# Patient Record
Sex: MALE | Race: WHITE | Employment: FULL TIME | URBAN - METROPOLITAN AREA
[De-identification: names, ages, dates, MRNs, and addresses within clinical notes are randomized per-mention and may not be internally consistent; named-entity substitution may affect disease eponyms.]

---

## 2017-06-25 ENCOUNTER — APPOINTMENT (OUTPATIENT)
Dept: GENERAL RADIOLOGY | Age: 27
End: 2017-06-25
Payer: COMMERCIAL

## 2017-06-25 ENCOUNTER — HOSPITAL ENCOUNTER (EMERGENCY)
Age: 27
Discharge: HOME | End: 2017-06-25
Attending: EMERGENCY MEDICINE
Payer: COMMERCIAL

## 2017-06-25 VITALS
SYSTOLIC BLOOD PRESSURE: 127 MMHG | WEIGHT: 190 LBS | HEART RATE: 80 BPM | OXYGEN SATURATION: 100 % | HEIGHT: 70 IN | TEMPERATURE: 98.4 F | RESPIRATION RATE: 20 BRPM | BODY MASS INDEX: 27.2 KG/M2 | DIASTOLIC BLOOD PRESSURE: 87 MMHG

## 2017-06-25 DIAGNOSIS — J40 BRONCHITIS: Primary | ICD-10-CM

## 2017-06-25 PROCEDURE — 99283 EMERGENCY DEPT VISIT LOW MDM: CPT

## 2017-06-25 PROCEDURE — 71020 XR CHEST STANDARD TWO VW: CPT

## 2017-06-25 PROCEDURE — 6370000000 HC RX 637 (ALT 250 FOR IP): Performed by: EMERGENCY MEDICINE

## 2017-06-25 RX ORDER — PREDNISONE 10 MG/1
50 TABLET ORAL DAILY
Qty: 5 TABLET | Refills: 0 | Status: SHIPPED | OUTPATIENT
Start: 2017-06-25 | End: 2017-06-30

## 2017-06-25 RX ORDER — DOXYCYCLINE HYCLATE 100 MG
100 TABLET ORAL ONCE
Status: COMPLETED | OUTPATIENT
Start: 2017-06-25 | End: 2017-06-25

## 2017-06-25 RX ORDER — DOXYCYCLINE HYCLATE 100 MG
100 TABLET ORAL 2 TIMES DAILY
Qty: 20 TABLET | Refills: 0 | Status: SHIPPED | OUTPATIENT
Start: 2017-06-25 | End: 2019-12-08

## 2017-06-25 RX ORDER — PREDNISONE 10 MG/1
50 TABLET ORAL DAILY
COMMUNITY
End: 2019-12-08

## 2017-06-25 RX ADMIN — DOXYCYCLINE HYCLATE 100 MG: 100 TABLET, COATED ORAL at 22:15

## 2017-06-26 ASSESSMENT — ENCOUNTER SYMPTOMS
WHEEZING: 1
COLOR CHANGE: 0
EYE REDNESS: 0
DIARRHEA: 0
NAUSEA: 0
EYE DISCHARGE: 0
CHEST TIGHTNESS: 0
BACK PAIN: 0
SORE THROAT: 0
RHINORRHEA: 0
COUGH: 1
EYE PAIN: 0
CONSTIPATION: 0
ABDOMINAL PAIN: 0
SHORTNESS OF BREATH: 0

## 2017-10-20 ENCOUNTER — APPOINTMENT (OUTPATIENT)
Dept: GENERAL RADIOLOGY | Facility: CLINIC | Age: 27
End: 2017-10-20
Payer: COMMERCIAL

## 2017-10-20 ENCOUNTER — HOSPITAL ENCOUNTER (EMERGENCY)
Facility: CLINIC | Age: 27
Discharge: HOME | End: 2017-10-21
Attending: EMERGENCY MEDICINE
Payer: COMMERCIAL

## 2017-10-20 DIAGNOSIS — S60.221A CONTUSION OF RIGHT HAND, INITIAL ENCOUNTER: Primary | ICD-10-CM

## 2017-10-20 PROCEDURE — 99283 EMERGENCY DEPT VISIT LOW MDM: CPT

## 2017-10-20 PROCEDURE — 73130 X-RAY EXAM OF HAND: CPT

## 2017-10-20 RX ORDER — ALBUTEROL SULFATE 90 UG/1
2 AEROSOL, METERED RESPIRATORY (INHALATION) EVERY 6 HOURS PRN
COMMUNITY
End: 2019-12-08

## 2017-10-20 ASSESSMENT — PAIN DESCRIPTION - ONSET: ONSET: SUDDEN

## 2017-10-20 ASSESSMENT — PAIN DESCRIPTION - LOCATION: LOCATION: HAND

## 2017-10-20 ASSESSMENT — PAIN DESCRIPTION - ORIENTATION: ORIENTATION: RIGHT

## 2017-10-20 ASSESSMENT — PAIN SCALES - GENERAL: PAINLEVEL_OUTOF10: 6

## 2017-10-20 ASSESSMENT — PAIN DESCRIPTION - PAIN TYPE: TYPE: ACUTE PAIN

## 2017-10-20 ASSESSMENT — PAIN DESCRIPTION - PROGRESSION: CLINICAL_PROGRESSION: GRADUALLY WORSENING

## 2017-10-20 ASSESSMENT — PAIN DESCRIPTION - DESCRIPTORS: DESCRIPTORS: CONSTANT;THROBBING

## 2017-10-21 VITALS
TEMPERATURE: 98.4 F | SYSTOLIC BLOOD PRESSURE: 127 MMHG | HEART RATE: 93 BPM | OXYGEN SATURATION: 99 % | RESPIRATION RATE: 17 BRPM | DIASTOLIC BLOOD PRESSURE: 83 MMHG

## 2017-10-21 ASSESSMENT — ENCOUNTER SYMPTOMS
RESPIRATORY NEGATIVE: 1
EYES NEGATIVE: 1
GASTROINTESTINAL NEGATIVE: 1

## 2017-10-21 NOTE — ED PROVIDER NOTES
eMERGENCY dEPARTMENT eNCOUnter      Pt Name: Araseli Grier  MRN: [id number]  [place] 1990  Date of evaluation: 10/20/2017      CHIEF COMPLAINT       Chief Complaint   Patient presents with    Hand Injury     right hand one pta          HISTORY OF PRESENT ILLNESS    Araseli Grier is a 32 y.o. male who presents To the emergency department evaluation of a right hand injury that he sustained when he punched a headboard. Patient does have some swelling to the dorsum of his hand but has good range of motion and pulses are equal.    REVIEW OF SYSTEMS         Review of Systems   HENT: Negative. Eyes: Negative. Respiratory: Negative. Gastrointestinal: Negative. Genitourinary: Negative. Musculoskeletal: Positive for joint pain. Skin: Negative. Neurological: Negative. Psychiatric/Behavioral: Negative. PAST MEDICAL HISTORY    has no past medical history on file. SURGICAL HISTORY      has no past surgical history on file. CURRENT MEDICATIONS       Previous Medications    ALBUTEROL SULFATE  (90 BASE) MCG/ACT INHALER    Inhale 2 puffs into the lungs every 6 hours as needed for Wheezing    CODEINE SULFATE PO    Take by mouth    DOXYCYCLINE HYCLATE (VIBRA-TABS) 100 MG TABLET    Take 1 tablet by mouth 2 times daily    PREDNISONE (DELTASONE) 10 MG TABLET    Take 50 mg by mouth daily       ALLERGIES     has No Known Allergies. FAMILY HISTORY     has no family status information on file. family history is not on file. SOCIAL HISTORY      reports that he has never smoked. He does not have any smokeless tobacco history on file. He reports that he does not drink alcohol or use drugs. PHYSICAL EXAM     INITIAL VITALS:  vitals were not taken for this visit. Physical Exam   Constitutional: He is oriented to person, place, and time. He appears well-developed and well-nourished. HENT:   Head: Normocephalic and atraumatic.    Eyes: EOM are normal. Pupils are equal, round, and reactive to light. Neck: Normal range of motion. Neck supple. Cardiovascular: Normal rate and regular rhythm. Pulmonary/Chest: Effort normal and breath sounds normal.   Abdominal: Soft. Bowel sounds are normal.   Musculoskeletal: Normal range of motion. He exhibits edema and tenderness. He exhibits no deformity. Neurological: He is alert and oriented to person, place, and time. Skin: Skin is warm and dry. Capillary refill takes 2 to 3 seconds. Psychiatric: He has a normal mood and affect. Vitals reviewed. DIFFERENTIAL DIAGNOSIS/ MDM:         DIAGNOSTIC RESULTS     EKG: All EKG's are interpreted by the Emergency Department Physician who either signs or Co-signs this chart in the absence of a cardiologist.        Not indicated unless otherwise documented above    LABS:  No results found for this visit on 10/20/17. Not indicated unless otherwise documented above    RADIOLOGY:   I reviewed the radiologist interpretations:  XR HAND RIGHT (MIN 3 VIEWS)   Final Result   Mild dorsal soft tissue swelling without evidence of acute fracture. Not indicated unless otherwise documented above    EMERGENCY DEPARTMENT COURSE:     The patient was given the following medications:  No orders of the defined types were placed in this encounter. Vitals: There were no vitals filed for this visit.  -------------------------  There were no vitals taken for this visit. I have reviewed the disposition diagnosis with the patient and or their family/guardian. I have answered their questions and given discharge instructions. They voiced understanding of these instructions and did not have any further questions or complaints. CRITICAL CARE:    None    CONSULTS:    None    PROCEDURES:    None      OARRS Report if indicated             FINAL IMPRESSION      1.  Contusion of right hand, initial encounter          DISPOSITION/PLAN   DISPOSITION Decision To Discharge    I have reviewed the disposition diagnosis with the patient and or their family/guardian. I have answered their questions and given discharge instructions. They voiced understanding of these instructions and did not have any further questions or complaints.     PATIENT REFERRED TO:  Jamin Virgilina Cassius [address] [place] [address], [address] [facility] [address] [phone]    In 2 days        DISCHARGE MEDICATIONS:  New Prescriptions    No medications on file       (Please note that portions of this note were completed with a voice recognition program.  Efforts were made to edit the dictations but occasionally words are mis-transcribed.)    Rodrigez MD  Attending Emergency Physician             Leticia Fenton MD  10/21/17 1785

## 2019-12-08 ENCOUNTER — HOSPITAL ENCOUNTER (EMERGENCY)
Age: 29
Discharge: HOME | End: 2019-12-08
Attending: EMERGENCY MEDICINE
Payer: COMMERCIAL

## 2019-12-08 ENCOUNTER — APPOINTMENT (OUTPATIENT)
Dept: GENERAL RADIOLOGY | Age: 29
End: 2019-12-08
Payer: COMMERCIAL

## 2019-12-08 VITALS
RESPIRATION RATE: 18 BRPM | WEIGHT: 190 LBS | TEMPERATURE: 98.4 F | SYSTOLIC BLOOD PRESSURE: 149 MMHG | DIASTOLIC BLOOD PRESSURE: 89 MMHG | HEART RATE: 93 BPM | HEIGHT: 70 IN | BODY MASS INDEX: 27.2 KG/M2 | OXYGEN SATURATION: 96 %

## 2019-12-08 DIAGNOSIS — S62.308A: Primary | ICD-10-CM

## 2019-12-08 PROCEDURE — 73130 X-RAY EXAM OF HAND: CPT

## 2019-12-08 PROCEDURE — 99283 EMERGENCY DEPT VISIT LOW MDM: CPT

## 2019-12-08 PROCEDURE — 29125 APPL SHORT ARM SPLINT STATIC: CPT

## 2019-12-08 RX ORDER — OXYCODONE HYDROCHLORIDE AND ACETAMINOPHEN 5; 325 MG/1; MG/1
1 TABLET ORAL EVERY 6 HOURS PRN
Qty: 10 TABLET | Refills: 0 | Status: SHIPPED | OUTPATIENT
Start: 2019-12-08 | End: 2019-12-11

## 2019-12-08 ASSESSMENT — PAIN DESCRIPTION - FREQUENCY: FREQUENCY: CONTINUOUS

## 2019-12-08 ASSESSMENT — PAIN DESCRIPTION - PROGRESSION: CLINICAL_PROGRESSION: NOT CHANGED

## 2019-12-08 ASSESSMENT — PAIN DESCRIPTION - LOCATION: LOCATION: FINGER (COMMENT WHICH ONE)

## 2019-12-08 ASSESSMENT — PAIN SCALES - GENERAL: PAINLEVEL_OUTOF10: 5

## 2019-12-08 ASSESSMENT — PAIN DESCRIPTION - DESCRIPTORS: DESCRIPTORS: ACHING

## 2019-12-08 ASSESSMENT — PAIN DESCRIPTION - ONSET: ONSET: ON-GOING

## 2019-12-08 ASSESSMENT — PAIN DESCRIPTION - PAIN TYPE: TYPE: ACUTE PAIN

## 2019-12-09 ASSESSMENT — ENCOUNTER SYMPTOMS
RESPIRATORY NEGATIVE: 1
GASTROINTESTINAL NEGATIVE: 1

## 2019-12-14 ENCOUNTER — HOSPITAL ENCOUNTER (EMERGENCY)
Age: 29
Discharge: HOME | End: 2019-12-14
Attending: EMERGENCY MEDICINE
Payer: COMMERCIAL

## 2019-12-14 VITALS
RESPIRATION RATE: 16 BRPM | TEMPERATURE: 98.1 F | DIASTOLIC BLOOD PRESSURE: 83 MMHG | SYSTOLIC BLOOD PRESSURE: 130 MMHG | HEART RATE: 78 BPM | OXYGEN SATURATION: 100 %

## 2019-12-14 DIAGNOSIS — G89.18 ACUTE POST-OPERATIVE PAIN: Primary | ICD-10-CM

## 2019-12-14 PROCEDURE — 99283 EMERGENCY DEPT VISIT LOW MDM: CPT

## 2019-12-14 ASSESSMENT — PAIN DESCRIPTION - PAIN TYPE: TYPE: ACUTE PAIN

## 2019-12-14 ASSESSMENT — PAIN DESCRIPTION - FREQUENCY: FREQUENCY: CONTINUOUS

## 2019-12-14 ASSESSMENT — PAIN DESCRIPTION - DESCRIPTORS: DESCRIPTORS: THROBBING

## 2019-12-14 ASSESSMENT — PAIN DESCRIPTION - LOCATION: LOCATION: HAND

## 2019-12-14 ASSESSMENT — PAIN SCALES - GENERAL: PAINLEVEL_OUTOF10: 4

## 2019-12-14 ASSESSMENT — PAIN DESCRIPTION - ORIENTATION: ORIENTATION: RIGHT

## 2020-01-30 ENCOUNTER — HOSPITAL ENCOUNTER (OUTPATIENT)
Dept: OCCUPATIONAL THERAPY | Facility: CLINIC | Age: 30
Setting detail: THERAPIES SERIES
Discharge: HOME | End: 2020-01-30
Payer: COMMERCIAL

## 2020-01-30 PROCEDURE — 97140 MANUAL THERAPY 1/> REGIONS: CPT

## 2020-01-30 PROCEDURE — 97165 OT EVAL LOW COMPLEX 30 MIN: CPT

## 2020-01-30 PROCEDURE — 97110 THERAPEUTIC EXERCISES: CPT
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
## 2020-02-03 ENCOUNTER — HOSPITAL ENCOUNTER (OUTPATIENT)
Dept: OCCUPATIONAL THERAPY | Facility: CLINIC | Age: 30
Setting detail: THERAPIES SERIES
Discharge: HOME | End: 2020-02-03
Payer: COMMERCIAL

## 2020-02-03 PROCEDURE — 97110 THERAPEUTIC EXERCISES: CPT

## 2020-02-03 PROCEDURE — 97140 MANUAL THERAPY 1/> REGIONS: CPT

## 2020-02-03 NOTE — FLOWSHEET NOTE
[] [place]       Occupational Therapy             1st floor       [address] [place], [place]         Phone: [phone]       Fax: [phone] [x] 6126 [facility] at            [address] , [address] [place] [address]     Phone: [phone]     Fax: [phone]      Occupational Therapy Daily Treatment Note    Date:  2/3/2020  Patient Name:  Fabby Padron    :  1990  MRN: [id number]  Insurance: [facility] - [employer]: [employer]     Medical Diagnosis: Closed displaced fracture of shaft of fourth metacarpal bone of right hand (spiral fx) S62.324A. Rehab Codes: Stiffness in hand M25.64, adherent scar L90.5, edema localized R60.0, pain in right hand M79.641,  pain in right finger(s) M79.644.      Onset Date: 19               Next Dr. Gerry Rothman: 20  1:00 [address]  office  Visit# / Total Visits:   C-9 [facility] for 36 visits 20 to 20. Cancels/No Shows: 0/0      Subjective:    Pain:  [x] Yes  [] No Location: Between the Middle and ring finger MCP joints Pain Rating: (0-10 scale) 2/10  Pain altered Tx:  [x] No  [] Yes  Action: NA  Pt Comments: \"My hand feels like I worked out\". Objective:  Modalities:     Exercises:  Exercise Reps/Time Weight/Level Comments   Edema massage     Administered  Home exercise program reviewed. Pt independent. Goal MET   Scar massage     Administered  Home exercise program reviewed. Pt independent. Goal MET   AROM- right hand - composite 10 reps   Completed  Home exercise program reviewed. Pt independent. Goal MET   AROM - right hand - blocked 10 reps   Completed  . Home exercise program reviewed. Pt independent. Goal MET   Resistive  - foam block 10 reps Pink Completed   Home exercise program reviewed. Pt independent.   Goal MET   Towel crunches  10 reps    Added  Pt education provided: written, verbal, demo.       Comments/Assessment:  Pt is s/p spiral fracture of the shaft of the right dominant fourth

## 2020-02-05 ENCOUNTER — HOSPITAL ENCOUNTER (OUTPATIENT)
Dept: OCCUPATIONAL THERAPY | Facility: CLINIC | Age: 30
Setting detail: THERAPIES SERIES
Discharge: HOME | End: 2020-02-05
Payer: COMMERCIAL

## 2020-02-05 PROCEDURE — 97110 THERAPEUTIC EXERCISES: CPT

## 2020-02-05 PROCEDURE — 97140 MANUAL THERAPY 1/> REGIONS: CPT

## 2020-02-05 NOTE — FLOWSHEET NOTE
[] [place]       Occupational Therapy             1st floor       [address] [place], [place]         Phone: [phone]       Fax: [phone] [x] 6135 [facility] at            [address] , [address] [place] [address]     Phone: [phone]     Fax: [phone]      Occupational Therapy Daily Treatment Note    Date:  2020  Patient Name:  Dakotah Proctor    :  1990  MRN: [id number]  Insurance: [facility] - [employer]: [employer]     Medical Diagnosis: Closed displaced fracture of shaft of fourth metacarpal bone of right hand (spiral fx) S62.324A. Rehab Codes: Stiffness in hand M25.64, adherent scar L90.5, edema localized R60.0, pain in right hand M79.641,  pain in right finger(s) M79.644.      Onset Date: 19               Next Dr. Onofre Moody: 20  1:00 [facility] office  Visit# / Total Visits: 3/36  C-9 auth for 36 visits 20 to 20. Cancels/No Shows: 0/0      Subjective:    Pain:  [x] Yes  [] No Location: Between the Middle and ring finger MCP joints Pain Rating: (0-10 scale) 1-2/10  Pain altered Tx:  [x] No  [] Yes  Action: NA  Pt Comments: \"My hand feels good today\". Objective:  Modalities:     Exercises:  Exercise Reps/Time Weight/Level Comments   Edema massage     Administered     Scar massage     Administered     AROM- right hand - composite 15 reps   Increased reps   AROM - right hand - blocked 15 reps   Increased reps  Discontinued middle finger, continued with ring and little fingers   Resistive  - foam block 15 reps [place] Increased reps   Towel crunches  10 reps    Completed      Place and hold (fisting) 4 reps  Added            Comments/Assessment:  Pt is s/p spiral fracture of the shaft of the right dominant fourth metacarpal.   Edema: decreasing on the ulnar aspect of the hand as evidenced by increased skin wrinkling, increased metacarpal phalangeal (MCP) joint definition.  edema of right ring finger also decreasing at the P1 area.  Scar:  Pin site scars softening, with increased mobility as evidenced by palpation. AROM: Pt demo's ability to make a modified fist with the right index and middle fingers, now touching palm with the ring finger,  little finger not touching at this time. PROM: ring and little finger have full composite flexion with mild over-pressure. Brief, sharp pain with PROM, reduced with extensor perez soft tissue massage. Towel crunches continued. Added place and hold exercise, with fair tolerance (painful). Specific Instructions for Next Treatment:      Treatment Charges: Mins Units Time In/Out   []?  Modalities         [x]?   Ther Exercise 33 2 1054 - 1135   [x]?   Manual Therapy 13 1 1045 - 1057   []?  Ther Activities         []?           []?           []?           Total Treatment time 46 min             Assessment: [x] Progressing toward goals. [] No change. [] Other:    Short Term Goals: (  10  Treatments)  1. Decrease Pain:  Will have 0/10 pain with non-resistive to mildly resistive activity. 2. Increase AROM/PROM (degrees): Will have full composite PROM flexion/extesnion of all fingers/all joints. Will be able to make a loose fist, touching ring and little fingers to palm. 3. Increase strength (pounds): Will have a right  strength of 30 or more pounds. 4. Increase function:UE Functional Index Score 46/80 or more to promote increased function abilities. 5. Scars (pin sites) will be soft and pliable with minimal tethering. 6. Decrease Edema: circumfirential measurements of bilateral ring fingers will have a variance of 0.2 cm or less. 7. Independent with [facility] in 3 sessions - MET.     Long Term Goals: (  20  Treatments)  1. Decrease Pain:  Will have 0/10 pain with mild to moderately resistive activity. 2.   Increase AROM/PROM (degrees): Will have full compositefist/full composite extension of all right fingers. 3.   ncrease strength (pounds):  Will have a right  strength of 45 or more

## 2020-02-10 ENCOUNTER — HOSPITAL ENCOUNTER (OUTPATIENT)
Dept: OCCUPATIONAL THERAPY | Facility: CLINIC | Age: 30
Setting detail: THERAPIES SERIES
Discharge: HOME | End: 2020-02-10
Payer: COMMERCIAL

## 2020-02-10 PROCEDURE — 97110 THERAPEUTIC EXERCISES: CPT

## 2020-02-10 PROCEDURE — 97140 MANUAL THERAPY 1/> REGIONS: CPT

## 2020-02-10 NOTE — FLOWSHEET NOTE
AROM/PROM (degrees): Will have full compositefist/full composite extension of all right fingers. 3.   ncrease strength (pounds): Will have a right  strength of 45 or more pounds. 4.   Increase function:UE Functional Index Score 60/80 or more to promote increased function abilities. 5.   Decrease Edema: circumfirential measurements of bilateral ring fingers will have a variance of 0.0 cm (edema resolved).     Patient Goals: Regain full function, full range of motion of my hand. Pt. Education:  [] Yes  [] No  [x] Reviewed Prior HEP/Ed  Method of Education: [x] Verbal  [] Demo  [] Written  Re:  Comprehension of Education:  [] Verbalizes understanding. [] Demonstrates understanding. [] Needs review. [x] Demonstrates/verbalizes HEP/Ed previously given. Treatment Plan:  Frequency/Duration:  3  Times a week, for 36  Visits. C-9 auth'd 01/17/20 to 04/18/20  Therapeutic Exercise 42461, Manual Therapy 55091, Ultrasound 89441, Written Home Program and Hot Pack/Cold Pack 36344       Time In/Out: 1045 -  1125  Total Treatment Time:  36   Min.       Electronically signed by:  PAYTON Mari/ZAID , AKIRA

## 2020-02-12 ENCOUNTER — HOSPITAL ENCOUNTER (OUTPATIENT)
Dept: OCCUPATIONAL THERAPY | Facility: CLINIC | Age: 30
Setting detail: THERAPIES SERIES
Discharge: HOME | End: 2020-02-12
Payer: COMMERCIAL

## 2020-02-12 PROCEDURE — 97140 MANUAL THERAPY 1/> REGIONS: CPT

## 2020-02-12 PROCEDURE — 97110 THERAPEUTIC EXERCISES: CPT

## 2020-02-12 NOTE — FLOWSHEET NOTE
[] [place]       Occupational Therapy             1st floor       [address] [place], [place]         Phone: [phone]       Fax: [phone] [x] 6135 [facility] at            [address] , [address] [place] [address]     Phone: [phone]     Fax: [phone]      Occupational Therapy Daily Treatment Note    Date:  2020  Patient Name:  Matias Dutton    :  1990  MRN: [id number]  Insurance: [facility] - [employer]: [employer]     Medical Diagnosis: Closed displaced fracture of shaft of fourth metacarpal bone of right hand (spiral fx) S62.324A. Rehab Codes: Stiffness in hand M25.64, adherent scar L90.5, edema localized R60.0, pain in right hand M79.641,  pain in right finger(s) M79.644.      Onset Date: 19               Next Dr. Gottlieb Pelayo: 20  1:00 [address] [facility] office  Visit# / Total Visits:   C-9 [facility] for 36 visits 20 to 20. Cancels/No Shows: 0/0      Subjective:    Pain:  [] Yes  [x] No Location: Between the Middle and ring finger MCP joints Pain Rating: (0-10 scale) 2/10  Pain altered Tx:  [x] No  [] Yes  Action: NA  Pt Comments: \" Yesterday had a episode of numbness and tingling in my thumb, it's gone today\".     Objective:  Modalities:     Exercises:  Exercise Reps/Time Weight/Level Comments   Edema massage     Administered     Scar massage     Administered     AROM- right hand - composite 10 reps   Decreased reps due to median nerve irritation   AROM - right hand - blocked 10 reps   Decreased reps  ring and little fingers, at table edge   Resistive  - foam block 10 reps Pink Decreased resistance, decreased reps    Towel crunches  10 reps    Completed      Place and hold (fisting) 6 reps  Completed   Marbles - ulnar release 4 handfuls  Decreased reps   Resistive  with hand exercisor 21 reps 20 pounds Decreased resistance and reps            Comments/Assessment:  Pt is s/p spiral fracture of the shaft of the right dominant measurements of bilateral ring fingers will have a variance of 0.2 cm or less. 7. Independent with [facility] in 3 sessions - MET.     Long Term Goals: (  20  Treatments)  1. Decrease Pain:  Will have 0/10 pain with mild to moderately resistive activity. 2.   Increase AROM/PROM (degrees): Will have full compositefist/full composite extension of all right fingers. 3.   ncrease strength (pounds): Will have a right  strength of 45 or more pounds. 4.   Increase function:UE Functional Index Score 60/80 or more to promote increased function abilities. 5.   Decrease Edema: circumfirential measurements of bilateral ring fingers will have a variance of 0.0 cm (edema resolved).     Patient Goals: Regain full function, full range of motion of my hand. Pt. Education:  [] Yes  [x] No  [] Reviewed Prior HEP/Ed  Method of Education: [] Verbal  [] Demo  [] Written  Re:  Comprehension of Education:  [] Verbalizes understanding. [] Demonstrates understanding. [] Needs review. [x] Demonstrates/verbalizes HEP/Ed previously given. Treatment Plan:  Frequency/Duration:  3  Times a week, for 36  Visits. C-9 auth'd 01/17/20 to 04/18/20  Therapeutic Exercise 61839, Manual Therapy 78673, Ultrasound 26303, Written Home Program and Hot Pack/Cold Pack 42418       Time In/Out: 1053 - 1133  Total Treatment Time:  36   Min.       Electronically signed by:  PAYTON Paniagua/ZAID , CHMYNOR

## 2020-02-17 ENCOUNTER — HOSPITAL ENCOUNTER (OUTPATIENT)
Dept: OCCUPATIONAL THERAPY | Facility: CLINIC | Age: 30
Setting detail: THERAPIES SERIES
Discharge: HOME | End: 2020-02-17
Payer: COMMERCIAL

## 2020-02-17 PROCEDURE — 97140 MANUAL THERAPY 1/> REGIONS: CPT

## 2020-02-17 PROCEDURE — 97035 APP MDLTY 1+ULTRASOUND EA 15: CPT

## 2020-02-17 PROCEDURE — 97110 THERAPEUTIC EXERCISES: CPT

## 2020-02-17 NOTE — FLOWSHEET NOTE
[] [place]       Occupational Therapy             1st floor       [address], [place]         Phone: [phone]       Fax: [phone] [x] 6135 [facility] at            [address] , [address] [place] [address]     Phone: [phone]     Fax: [phone]      Occupational Therapy Daily Treatment Note    Date:  2020  Patient Name:  Julia Rayo    :  1990  MRN: [id number]  Insurance: [facility] - [email or web address]: [employer]     Medical Diagnosis: Closed displaced fracture of shaft of fourth metacarpal bone of right hand (spiral fx) S62.324A. Rehab Codes: Stiffness in hand M25.64, adherent scar L90.5, edema localized R60.0, pain in right hand M79.641,  pain in right finger(s) M79.644.      Onset Date: 19               Next Dr. Onofre Moody: 20  1:00 [address] office  Visit# / Total Visits:   C-9 [facility] for 36 visits 20 to 20. Cancels/No Shows: 0/0      Subjective:    Pain:  [] Yes  [x] No Location: Between the Middle and ring finger MCP joints Pain Rating: (0-10 scale) 2/10  Pain altered Tx:  [x] No  [] Yes  Action: NA  Pt Comments: \" Yesterday had a episode of numbness and tingling in my thumb, it's gone today\". Objective:  Modalities:   Modality Flow Sheet:  START STOP Tx Modality     Electrical Stim:     20  Ultrasound: 0.8 W/cm2 x 8 mins total  Duty factor: __100%  __50%  __33% __20%  Head size: 2 cm  MHz: __1mHz  __3mHz  Location: Dorsum right ring MCP joint, little finger DIP joint - 4 minutes each.      Hot Pack:     Cold Pack:     Exercises:  Exercise Reps/Time Weight/Level Comments   Edema massage     Administered     Scar massage     Administered     AROM- right hand - composite 10 reps   Completed   AROM - right hand - blocked 10 reps   Decreased reps  ring and little fingers, at table edge   Resistive  - foam block 10 reps Pink Decreased resistance, decreased reps    Towel crunches  10 reps    Completed    Place and hold (fisting) 6 reps  Completed   Marbles - ulnar release 1 series  Increased reps   Resistive  with hand exercisor 27 reps 25 pounds Increased resistance and reps   Resistive /pinches/extension 10 reps each [place] Added  Pt education/precautions provided: written, verbal, demo.      Comments/Assessment:  Pt is s/p spiral fracture of the shaft of the right dominant fourth metacarpal.   Edema: decreasing on the ulnar aspect of the hand as evidenced by increased skin wrinkling, increased metacarpal phalangeal (MCP) joint definition. Edema of right ring finger also decreasing at the P1 area. Scar:  Pin site scars softening, with increased mobility as evidenced by palpation. AROM: Pt demo's ability to make a full fist with the right index, middle, and ring fingers, little finger on arrival touching palm in modified fist with mild effort (improved). PROM: ittle finger has full composite flexion with mild over-pressure. Towel crunches, place and hold exercises, ulnar release of marbles for little finger dexterity, foam block increased as noted above. Putty added to exercise program, with good pt performance observed. Pain 0/10 pre- and post treatment. Specific Instructions for Next Treatment:      Treatment Charges: Mins Units Time In/Out   []?  Modalities: Ultrasound 8 7 0037 - 0919   [x]?   Ther Exercise 30 2 1102 - 1132   [x]?   Manual Therapy 22 1 1040 - 1102   []?  Ther Activities         []?           []?           []?           Total Treatment time 60 min             Assessment: [] Progressing toward goals. [x] No change. [] Other:    Short Term Goals: (  10  Treatments)  1. Decrease Pain:  Will have 0/10 pain with non-resistive to mildly resistive activity. 2. Increase AROM/PROM (degrees): Will have full composite PROM flexion/extesnion of all fingers/all joints. Will be able to make a loose fist, touching ring and little fingers to palm. 3. Increase strength (pounds):  Will have

## 2020-02-19 ENCOUNTER — HOSPITAL ENCOUNTER (OUTPATIENT)
Dept: OCCUPATIONAL THERAPY | Facility: CLINIC | Age: 30
Setting detail: THERAPIES SERIES
Discharge: HOME | End: 2020-02-19
Payer: COMMERCIAL

## 2020-02-19 PROCEDURE — 97140 MANUAL THERAPY 1/> REGIONS: CPT

## 2020-02-19 PROCEDURE — 97110 THERAPEUTIC EXERCISES: CPT

## 2020-02-19 NOTE — PROGRESS NOTES
improvement in functional abilities  Initial Functional Level:  36/80 functionally impaired as measured with the Upper Extremity Functional Index Survey. (The UEFI model does not provide any specific cut off points that could classify the upper limb disability degree, however, a minimal detectable change of 9 points is provided. This means that for improvement or deterioration to be considered, between two subsequent evaluations, the scores must differ by at least 9 points.)      AROM:  DIGITS   Current 02/19/20       Extension/Flexion  RIGHT   Degrees INDEX MIDDLE RING LITTLE   MCP    +8/WNL +20/WNL +18/80    WNL +15/WNL   PIP -10/WNL  -14/WNL -18/100 -10/WNL   DIP  +5/WNL   +5/WNL +10/75    WNL  +4/WNL    Full fist Full fist Full fist (improved 2 cm) Full fist (improved 4 cm)   PIP extensor lags of all four fingers documented above. AROM:  DIGITS   Comparison, Initial 01/30/20       Extension/Flexion  RIGHT   Degrees INDEX MIDDLE RING LITTLE   MCP    -2/98    -7/92   -3/73  +8/74   PIP -15/104  -15/88 -20/82 -23/70   DIP  +4/55 +15/40  +5/44 -  5/41    Touching finger pad to palm Touching finger pad to palm Finger pad is 2 cm from touching pad to palm Finger pad is 4 cm from touching pad to palm   Right wrist and thumb are unremarkable for AROM. STRENGTH   Initial 02/19/20             Pounds RIGHT LEFT    46.3 95.3   Lateral pinch 19 24   2 point pinch 13 16   3 jaw pinch 15 17     The affected extremity is 52.9% weaker than the unaffected extremity. (affected score/unaffected score, take the total and subtract from 100)    Sensibility: Normal     Edema: Circumfirential measurements of bilateral ring fingers at P1:  Right  6.9 cm (decr 0.3 cm), Left 6.7 cm (same)    Variance: 0.2 cm - improved 0.3 cm. A variance of 0.5 cm or more is considered significant edema.       Skin Color: Normal    Skin: Healed    Problems: Pain, ROM, Strength, Function, Edema and Adherent Scar      Short Term Goals: (  10 attempting to restrain a suspect. Fracture is s/p pinning (2). Edema: resolved in hand. Pt has slight edema of the P1 area of the ring finger (0.2cm variance compared to contralateral ring finger). AROM: Pt demo's ability to make a full active right fist. PIP joint extensor lags of all fingers documented above. Scar:  Pin site scars soft with minimal tethering. Strength: The affected right dominant  is currently 52.9% weaker than the unaffected left . Pt needs full strength to return to work as a . 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      Modalities:   Modality Flow Sheet:  START STOP Tx Modality       Electrical Stim:      02/17/20   Ultrasound: 0.8 W/cm2 x 8 mins total  Duty factor: __100%  __50%  __33% __20%  Head size: 2 cm  MHz: __1mHz  __3mHz  Location: Volar PIP joint, dorsal little finger DIP joint - 4 minutes each.       Hot Pack:       Cold Pack:      Exercises:  Exercise Reps/Time Weight/Level Comments   Edema massage     Administered      Scar massage     Administered      AROM- right hand - composite 20 reps   Completed   AROM - right hand - blocked 20 reps   Completed   Resistive  - foam block 10 reps Pink Not Completed due to measurements   Towel crunches  10 reps    Not Completed      Place and hold (fisting)   Discontinued  Full active fist present   Marbles - ulnar release 1 series   Not Completed   Resistive  with hand exercisor 45 reps 25 pounds Increased reps   Resistive /pinches/extension 5-10 reps each Tan Completed        Treatment This Date:   Treatment Charges: Mins Units Time In/Out   [x]  Modalities: Ultrasound   8 0 1102 - 1110   [x]  Ther Exercise 43 3 1118 - 1201   [x]  Manual Therapy   8 1 1110 - 1118   []  Ther Activities      []        []        []        Total Treatment time 59 min Pt. Education:  []? Yes  [x]? No  []? Reviewed Prior HEP/Ed  Method of Education: []? Verbal  []? Demo  []? Written  Re:  Comprehension of Education:  []? Verbalizes understanding. []? Demonstrates understanding. []? Needs review. []? Demonstrates/verbalizes HEP/Ed previously given.       Treatment Plan:  Frequency/Duration:  3  Times a week, for 36  Visits.   C-9 auth'd 01/17/20 to 04/18/20  Therapeutic Exercise 70496, Manual Therapy 08398, Ultrasound 00198, Written Home Program and Hot Pack/Cold Pack 53951     Total Treatment Time:  59  Minutes     Time In/Time Out: 1102 - 1201       Electronically signed by PAYTON Paniagua/L, CHT on 2/19/2020 at 11:03 AM

## 2020-02-20 ENCOUNTER — HOSPITAL ENCOUNTER (OUTPATIENT)
Dept: OCCUPATIONAL THERAPY | Facility: CLINIC | Age: 30
Setting detail: THERAPIES SERIES
Discharge: HOME | End: 2020-02-20
Payer: COMMERCIAL

## 2020-02-20 PROCEDURE — 97110 THERAPEUTIC EXERCISES: CPT

## 2020-02-20 PROCEDURE — 97140 MANUAL THERAPY 1/> REGIONS: CPT

## 2020-02-20 NOTE — FLOWSHEET NOTE
to promote increased function abilities - MET (61/80). 5. Scars (pin sites) will be soft and pliable with minimal tethering - MET. 6. Decrease Edema: circumfirential measurements of bilateral ring fingers will have a variance of 0.2 cm or less - MET (0.2 variance). 7. Independent with HEP in 3 sessions - MET.     Long Term Goals: (  20  Treatments)  1. Decrease Pain:  Will have 0/10 pain with mild to moderately resistive activity. Ongoing  2. Increase AROM/PROM (degrees): Will have full compositefist/full composite extension of all right fingers (WNL) - MET (full fist/full extension). 3.   Increase strength (pounds): Will have a right  strength of 45 or more pounds - MET (46.3) New  goal (revised 02/19/20): Will have a right  strength of 60 or more pounds. 4.   Increase function: UE Functional Index Score 60/80 or more to promote increased function abilities - MET (61/80) New function goal (revised 02/19/20): UE Functional Index Score 70/80 or more to promote increased function abilities . 5.   Decrease Edema: circumfirential measurements of bilateral ring fingers will have a variance of 0.0 cm (edema resolved). Ongoing     Patient Goals: Regain full function - Improved, Unmet, full range of motion of my hand - MET. New goal (added 02/19/20): Be able to fire firearms for work.       Pt. Education:  [] Yes  [x] No  [] Reviewed Prior HEP/Ed  Method of Education: [] Verbal  [] Demo  [] Written  Re:  Comprehension of Education:  [] Verbalizes understanding. [] Demonstrates understanding. [] Needs review. [] Demonstrates/verbalizes HEP/Ed previously given. Treatment Plan:  Frequency/Duration:  3  Times a week, for 36  Visits. C-9 auth'd 01/17/20 to 04/18/20  Therapeutic Exercise 37148, Manual Therapy 45630, Ultrasound 79098, Written Home Program and Hot Pack/Cold Pack 27786       Time In/Out: 3963 - 3773  Total Treatment Time:  36   Min.       Electronically signed by:  Néstor Birmingham

## 2020-02-24 ENCOUNTER — HOSPITAL ENCOUNTER (OUTPATIENT)
Dept: OCCUPATIONAL THERAPY | Facility: CLINIC | Age: 30
Setting detail: THERAPIES SERIES
Discharge: HOME | End: 2020-02-24
Payer: COMMERCIAL

## 2020-02-24 PROCEDURE — 97110 THERAPEUTIC EXERCISES: CPT

## 2020-02-24 NOTE — FLOWSHEET NOTE
[] [place]       Occupational Therapy             1st floor       [address] [facility]         Phone: [phone]       Fax: [phone] [x] [address] [facility] at            [address] , [address] [place] [address]     Phone: [phone]     Fax: [phone]      Occupational Therapy Daily Treatment Note    Date:  2020  Patient Name:  Didier Collier    :  1990  MRN: [id number]  Insurance: [facility] - [employer]: [employer]     Medical Diagnosis: Closed displaced fracture of shaft of fourth metacarpal bone of right hand (spiral fx) S62.324A. Rehab Codes: Stiffness in hand M25.64, adherent scar L90.5, edema localized R60.0, pain in right hand M79.641,  pain in right finger(s) M79.644.      Onset Date: 19               Next Dr. Newberry Peper: 20  1:00 [address] [facility] office  Visit# / Total Visits:   C-9 [facility] for 36 visits 20 to 20.    Cancels/No Shows: 0/0      Subjective:    Pain:  [] Yes  [x] No Location: Between the Middle and ring finger MCP joints Pain Rating: (0-10 scale) 0/10  Pain altered Tx:  [x] No  [] Yes  Action: NA  Pt Comments: NA    Objective:  Modalities:  Modality Flow Sheet:  START STOP Tx Modality       Electrical Stim:      20   Ultrasound: 0.8 W/cm2 x 8 mins total  Duty factor: __100%  __50%  __33% __20%  Head size: 2 cm  MHz: __1mHz  __3mHz  Location: Dorsal MCP joints of middle and ring fingers       Hot Pack:       Cold Pack:      Exercises:  Exercise Reps/Time Weight/Level Comments   Edema massage     Administered      Scar massage     Discontinued      AROM- right hand - composite 20 reps   Completed   AROM - right hand - blocked 20 reps   Completed   Resistive  - foam block 10 reps Blue Increased resistance     Towel crunches  10 reps    Completed      Place and hold (fisting)     Discontinued   Marbles - ulnar release    Discontinued   Resistive  with hand exercisor 30 reps 30 pounds Increased resistance, decreased reps   Resistive /pinches/extension 10 reps each Tan Increased reps      Resistive pinch with foam cubes and pinch pin - ring and little fingers 1 handful 2 pounds Completed   Digi-flex As tolerated 5 pounds Added         Comments/Assessment:   Pt is s/p spiral fracture of the shaft of the right dominant fourth metacarpal.   Edema: Zero to slight on the ulnar aspect of the hand as evidenced by increased skin wrinkling, increased metacarpal phalangeal (MCP) joint definition. Edema of right ring finger also decreasing at the P1 area as evidenced by observation. AROM: Pt demo's ability to make a full fist with all fingers consistently. There is a slight extensor lag noted in all fingers at the PIP joints, affected ring finger is the most noticeable. Darmichaelan [place] PROM: Full extension achieved of all PIP finger joints with zero to mild over-pressure. Towel crunches, putty,and foam block ex continued. Digi-flex added to strengthening program.  Pt states he can now hold his firearm, but has serious doubts he could control the recoil when fired. Pt states he probably will be released to return to light duty following physician appointment Wednesday 2/26/20. Pain 0/10 pre- and post treatment. Specific Instructions for Next Treatment:      Treatment Charges: Mins Units Time In/Out   []?  Modalities: Ultrasound   8 0 4567 - 2763   [x]?   Ther Exercise 44 3 6226 - 9146   [x]?   Manual Therapy   0 0    []?  Ther Activities         []?           []?           []?           Total Treatment time 52 min             Assessment: [x] Progressing toward goals. [] No change. [] Other:    Short Term Goals: (  10  Treatments)  1. Decrease Pain:  Will have 0/10 pain with non-resistive to mildly resistive activity - Improved, Unmet. 2. Increase AROM/PROM (degrees): Will have full composite PROM flexion/extesnion of all fingers/all joints - MET.  Will be able to make a loose fist, touching ring and little fingers to

## 2020-02-26 ENCOUNTER — HOSPITAL ENCOUNTER (OUTPATIENT)
Dept: OCCUPATIONAL THERAPY | Facility: CLINIC | Age: 30
Setting detail: THERAPIES SERIES
Discharge: HOME | End: 2020-02-26
Payer: COMMERCIAL

## 2020-02-26 PROCEDURE — 97110 THERAPEUTIC EXERCISES: CPT

## 2020-02-26 NOTE — FLOWSHEET NOTE
Resistive /pinches/extension 10 reps each Tan Completed  Plan to increase resistance next session   Resistive pinch with foam cubes and pinch pin - ring and little fingers 1 handful 2 pounds Completed   Digi-flex (individual resistive finger flexion) As tolerated 5 pounds Completed         Comments/Assessment:   Pt is s/p spiral fracture of the shaft of the right dominant fourth metacarpal.   Edema: Zero to slight on the ulnar aspect of the hand as evidenced by increased skin wrinkling, increased metacarpal phalangeal (MCP) joint definition. Pt saw physician prior to today's appointment. Pt to continue occupational therapy for an additional 4 weeks. Pt to return to work on light duty 03/02/20, and follow up with physicain again 03/11/20. Treatment emphasis will now be on strengthening to return to full duty. Ultrasound discontinued this date. AROM: Pt demo's ability to make a full fist. There is a PIP joint extensor lags of all fingers have resolved. Towel crunches, putty,and foam block, Digi-flex ex continued. Pain 0/10 pre- and post treatment. Specific Instructions for Next Treatment:      Treatment Charges: Mins Units Time In/Out   []?  Modalities:         [x]?   Ther Exercise 29 2 1320 - 134   []?  Manual Therapy      []?  Ther Activities         []?           []?           []?           Total Treatment time 29 min             Assessment: [x] Progressing toward goals. [] No change. [] Other:    Short Term Goals: (  10  Treatments)  1. Decrease Pain:  Will have 0/10 pain with non-resistive to mildly resistive activity - Improved, Unmet. 2. Increase AROM/PROM (degrees): Will have full composite PROM flexion/extesnion of all fingers/all joints - MET. Will be able to make a loose fist, touching ring and little fingers to palm - MET (full fist). 3. Increase strength (pounds): Will have a right  strength of 30 or more pounds - MET (46.3 pounds).    4. Increase function:UE Functional Index

## 2020-03-02 ENCOUNTER — HOSPITAL ENCOUNTER (OUTPATIENT)
Dept: OCCUPATIONAL THERAPY | Facility: CLINIC | Age: 30
Setting detail: THERAPIES SERIES
Discharge: HOME | End: 2020-03-02
Payer: COMMERCIAL

## 2020-03-02 PROCEDURE — 97110 THERAPEUTIC EXERCISES: CPT

## 2020-03-02 NOTE — FLOWSHEET NOTE
[] [place]       Occupational Therapy             1st floor       [address] [place], [place]         Phone: [phone]       Fax: [phone] [x] [address] [facility] at            [address] , [address] [place] [address]     Phone: [phone]     Fax: [phone]      Occupational Therapy Daily Treatment Note    Date:  3/2/2020  Patient Name:  Gerard Machado    :  1990  MRN: [id number]  Insurance: [address] [place] [address] - [employer]: [employer]     Medical Diagnosis: Closed displaced fracture of shaft of fourth metacarpal bone of right hand (spiral fx) S62.324A. Rehab Codes: Stiffness in hand M25.64, adherent scar L90.5, edema localized R60.0, pain in right hand M79.641,  pain in right finger(s) M79.644.      Onset Date: 19               Next Dr. Smalls Oris: 20  2:15 [address] [facility] office  Visit# / Total Visits:   [address] for 36 visits 20 to 20.    Cancels/No Shows: 0/0      Subjective:    Pain:  [] Yes  [x] No Location: Between the Middle and ring finger MCP joints Pain Rating: (0-10 scale) 0/10  Pain altered Tx:  [x] No  [] Yes  Action: NA  Pt Comments: NA    Objective:  Modalities:  Modality Flow Sheet:  START STOP Tx Modality       Electrical Stim:      20 Ultrasound: 0.8 W/cm2 x 8 mins total  Duty factor: __100%  __50%  __33% __20%  Head size: 2 cm  MHz: __1mHz  __3mHz  Location: Dorsal MCP joints of middle and ring fingers       Hot Pack:       Cold Pack:      Exercises:  Exercise Reps/Time Weight/Level Comments   Edema massage     Discontinued      Scar massage     Discontinued      AROM- right hand - composite 20 reps  Extension only   AROM - right hand - blocked   Discontinued   Resistive  - foam block 10 reps Green Completed     Towel crunches  10 reps    Completed      Place and hold (fisting)     Discontinued   Marbles - ulnar release    Discontinued   Resistive  with hand exercisor 45 reps 35 pounds Increased  resistance

## 2020-03-04 ENCOUNTER — HOSPITAL ENCOUNTER (OUTPATIENT)
Dept: OCCUPATIONAL THERAPY | Facility: CLINIC | Age: 30
Setting detail: THERAPIES SERIES
Discharge: HOME | End: 2020-03-04
Payer: COMMERCIAL

## 2020-03-04 PROCEDURE — 97110 THERAPEUTIC EXERCISES: CPT

## 2020-03-04 NOTE — FLOWSHEET NOTE
[] [place]       Occupational Therapy             1st floor       [address] [place], [place]         Phone: [phone]       Fax: [phone] [x] [address] [facility] at            [address] , [address] [place] [address]     Phone: [phone]     Fax: [phone]      Occupational Therapy Daily Treatment Note    Date:  3/4/2020  Patient Name:  Gerard Machado    :  1990  MRN: [id number]  Insurance: [address] [place] [address] - [employer]: [employer]     Medical Diagnosis: Closed displaced fracture of shaft of fourth metacarpal bone of right hand (spiral fx) S62.324A. Rehab Codes: Stiffness in hand M25.64, adherent scar L90.5, edema localized R60.0, pain in right hand M79.641,  pain in right finger(s) M79.644.      Onset Date: 19               Next Dr. Smalls Oris: 20  2:15 [address] [facility] office  Visit# / Total Visits:   [address] for 36 visits 20 to 20.    Cancels/No Shows: 0/0      Subjective:    Pain:  [] Yes  [x] No Location: Between the Middle and ring finger MCP joints Pain Rating: (0-10 scale) 0/10  Pain altered Tx:  [x] No  [] Yes  Action: NA  Pt Comments: NA    Objective:  Modalities:  Modality Flow Sheet:  START STOP Tx Modality       Electrical Stim:      20 Ultrasound: 0.8 W/cm2 x 8 mins total  Duty factor: __100%  __50%  __33% __20%  Head size: 2 cm  MHz: __1mHz  __3mHz  Location: Dorsal MCP joints of middle and ring fingers       Hot Pack:       Cold Pack:      Exercises:  Exercise Reps/Time Weight/Level Comments   Edema massage     Discontinued      Scar massage     Discontinued      AROM- right hand - composite 20 reps  Extension only   AROM - right hand - blocked   Discontinued   Resistive  - foam block 10 reps Green Completed     Towel crunches  10 reps    Completed      Place and hold (fisting)     Discontinued   Marbles - ulnar release    Discontinued   Resistive  with hand exercisor 40 reps 40 pounds Increased  Resistance, decreased reps   Resistive /pinches/extension - with putty 10 reps each Yellow Completed   Resistive pinch with foam cubes and pinch pin - ring and little fingers 2 handfuls 4 pounds  (green) Increased resistance   Digi-flex (individual resistive finger flexion) 20 reps 5 pounds Increased reps         Comments/Assessment:   Pt is s/p spiral fracture of the shaft of the right dominant fourth metacarpal.   Edema: Zero to slight on the ulnar aspect of the hand as evidenced by increased skin wrinkling, increased metacarpal phalangeal (MCP) joint definition. Pt to continue occupational therapy for an additional 4 weeks (approximately 3/25/20). Pt returned to work on light duty on 03/02/20, sees physicain again 03/11/20. Treatment emphasis now on strengthening in order to return to full duty. AROM: Pt demo's ability to make a full fist and full finger extension. Function exercises advanced, with good pt performance observed. Pain 0/10 pre- and post treatment. Specific Instructions for Next Treatment:      Treatment Charges: Mins Units Time In/Out   []?  Modalities:         [x]?   Ther Exercise 37 2 2037 - 4835   []?  Manual Therapy      []?  Ther Activities         []?           []?           []?           Total Treatment time 37 min             Assessment: [x] Progressing toward goals. [] No change. [] Other:    Short Term Goals: (  10  Treatments)  1. Decrease Pain:  Will have 0/10 pain with non-resistive to mildly resistive activity - Improved, Unmet. 2. Increase AROM/PROM (degrees): Will have full composite PROM flexion/extesnion of all fingers/all joints - MET. Will be able to make a loose fist, touching ring and little fingers to palm - MET (full fist). 3. Increase strength (pounds): Will have a right  strength of 30 or more pounds - MET (46.3 pounds). 4. Increase function:UE Functional Index Score 46/80 or more to promote increased function abilities - MET (61/80).   5. Scars (pin

## 2020-03-05 ENCOUNTER — HOSPITAL ENCOUNTER (OUTPATIENT)
Dept: OCCUPATIONAL THERAPY | Facility: CLINIC | Age: 30
Setting detail: THERAPIES SERIES
Discharge: HOME | End: 2020-03-05
Payer: COMMERCIAL

## 2020-03-05 PROCEDURE — 97110 THERAPEUTIC EXERCISES: CPT

## 2020-03-05 NOTE — FLOWSHEET NOTE
[] [place]       Occupational Therapy             1st floor       [address] [place], [place]         Phone: [phone]       Fax: [phone] [x] [facility]            [address] , [address] [place] [address]     Phone: [phone]     Fax: [phone]      Occupational Therapy Daily Treatment Note    Date:  3/5/2020  Patient Name:  Fabby Padron    :  1990  MRN: [id number]  Insurance: [facility] - [employer]: [employer]     Medical Diagnosis: Closed displaced fracture of shaft of fourth metacarpal bone of right hand (spiral fx) S62.324A. Rehab Codes: Stiffness in hand M25.64, adherent scar L90.5, edema localized R60.0, pain in right hand M79.641,  pain in right finger(s) M79.644.      Onset Date: 19               Next Dr. Gerry Rothman: 20  2:15 [facility] office  Visit# / Total Visits:   -[address] [place] [address] 36 visits 20 to 20.    Cancels/No Shows: 0/0      Subjective:    Pain:  [] Yes  [x] No Location: Between the Middle and ring finger MCP joints Pain Rating: (0-10 scale) 0/10  Pain altered Tx:  [x] No  [] Yes  Action: NA  Pt Comments: NA    Objective:  Modalities:  Modality Flow Sheet:  START STOP Tx Modality       Electrical Stim:      20 Ultrasound: 0.8 W/cm2 x 8 mins total  Duty factor: __100%  __50%  __33% __20%  Head size: 2 cm  MHz: __1mHz  __3mHz  Location: Dorsal MCP joints of middle and ring fingers       Hot Pack:       Cold Pack:      Exercises:  Exercise Reps/Time Weight/Level Comments   Edema massage     Discontinued      Scar massage     Discontinued      AROM- right hand - composite 20 reps  Extension only   AROM - right hand - blocked   Discontinued   Resistive  - foam block 10 reps Green Completed     Towel crunches  10 reps    Completed      Place and hold (fisting)     Discontinued   Marbles - ulnar release    Discontinued   Resistive  with hand exercisor 22 reps 40 pounds Increased reps Resistive /extension - with putty 10 reps each Orange Increased resistance  [place] putty issued for home   Resistive pinch with foam cubes and pinch pin - ring and little fingers 3 handfuls 4 pounds  (green) Increased reps   Digi-flex (individual resistive finger flexion) 25 reps 5 pounds Increased reps   Resistive finger extension with rubber band ball 10 reps Number 64 rubber bands Added         Comments/Assessment:   Pt is s/p spiral fracture of the shaft of the right dominant fourth metacarpal.   Edema: Zero to slight on the ulnar aspect of the hand as evidenced by increased skin wrinkling, increased metacarpal phalangeal (MCP) joint definition. Pt to continue occupational therapy for an additional 4 weeks (approximately 3/25/20). Pt returned to work on light duty on 03/02/20, sees physicain again 03/11/20. Treatment emphasis now on strengthening in order to return to full duty. AROM: Pt demo's ability to make a full fist and full finger extension. Function exercises advanced, with good pt performance observed. Pain 0/10 pre- and post treatment. Specific Instructions for Next Treatment:      Treatment Charges: Mins Units Time In/Out   []?  Modalities:         [x]?   Ther Exercise 37 2 1300 - 1337   []?  Manual Therapy      []?  Ther Activities         []?           []?           []?           Total Treatment time 36 min             Assessment: [x] Progressing toward goals. [] No change. [] Other:    Short Term Goals: (  10  Treatments)  1. Decrease Pain:  Will have 0/10 pain with non-resistive to mildly resistive activity - Improved, Unmet. 2. Increase AROM/PROM (degrees): Will have full composite PROM flexion/extesnion of all fingers/all joints - MET. Will be able to make a loose fist, touching ring and little fingers to palm - MET (full fist). 3. Increase strength (pounds): Will have a right  strength of 30 or more pounds - MET (46.3 pounds).    4. Increase function:UE Functional signed by:  Brandon Oro, OTR/L , CHT

## 2020-03-09 ENCOUNTER — HOSPITAL ENCOUNTER (OUTPATIENT)
Dept: OCCUPATIONAL THERAPY | Facility: CLINIC | Age: 30
Setting detail: THERAPIES SERIES
Discharge: HOME | End: 2020-03-09
Payer: COMMERCIAL

## 2020-03-09 PROCEDURE — 97110 THERAPEUTIC EXERCISES: CPT

## 2020-03-09 NOTE — PROGRESS NOTES
fist/extension)  3. Increase strength (pounds): New  goal (revised 02/19/20): Will have a right  strength of 60 or more pounds - MET (69.6 pounds). New  goal (revised 03/09/20): Will have a right  strength of 110 or more pounds. 4.   Increase function:  New function goal (revised 02/19/20): UE Functional Index Score 70/80 or more to promote increased function abilities - MET (76/80). New function goal (revised 03/09/20): UE Functional Index Score 80/80 topromote increased function abilities. 5.   Decrease Edema: circumfirential measurements of bilateral ring fingers will have a variance of 0.0 cm (edema resolved). Ongoing (Current variance of 0.2 cm)    Patient Goals: Regain full function - Improved, Unmet, full range of motion of my hand - MET. New goal (added 02/19/20): Be able to fire firearms for work. Ongoing    Comments/Assessment:  Pt is a  who sustained a spiral fracture of the shaft of the right fourth metacarpal. Fracture is s/p pinning (2). Edema: resolved in hand. Pt has slight edema of the P1 area of the ring finger (0.2cm variance compared to contralateral ring finger - unchanged). AROM: Pt demo's ability to make a full active right fist. PIP joint extensor lags resolved with mild  effort. Scar:  Pin site scars soft with minimal tethering. Strength: The affected right dominant  is currently 36.8%% weaker than the unaffected left  (improved 16.1%). Pt needs full strength to return to work as a . Pt to continue occupational therapy for an additional 4 weeks (approximately 3/25/20). Pt returned to work on light duty on 03/02/20, sees physicain again 03/11/20. Treatment emphasis now on strengthening in order to return to full duty. Pain:  0/10 pre- and post treatment.     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 Modalities:  Modality Flow Sheet:  START STOP Tx Modality       Electrical Stim:      02/17/20 02/26/20 Ultrasound: 0.8 W/cm2 x 8 mins total  Duty factor: __100%  __50%  __33% __20%  Head size: 2 cm  MHz: __1mHz  __3mHz  Location: Dorsal MCP joints of middle and ring fingers       Hot Pack:       Cold Pack:      Exercises:  Exercise Reps/Time Weight/Level Comments   Edema massage     Discontinued      Scar massage     Discontinued      AROM- right hand - composite 20 reps   Discontinued   AROM - right hand - blocked     Discontinued   Resistive  - foam block 10 reps Green Completed      Towel crunches    Discontinued   Place and hold (fisting)     Discontinued   Marbles - ulnar release     Discontinued   Resistive  with hand exercisor 40 reps 40 pounds Increased reps   Resistive /extension - with putty 10 reps each Fleming Completed   Resistive pinch with foam cubes and pinch pin - ring and little fingers 1 series 4 pounds  (green) Increased reps   Digi-flex (individual resistive finger flexion) 10 reps 7 pounds Increased resistance, decreased reps   Resistive finger extension with rubber band ball 10 reps Number 64 rubber bands Completed        Treatment This Date:   Treatment Charges: Mins Units Time In/Out   []  Modalities:       [x]  Ther Exercise 42 3 1308 - 1350   [x]  Manual Therapy   8 0 1300 - 1308   []  Ther Activities      []        []        []        Total Treatment time 50 min           Pt. Education:  []? Yes  [x]? No  []? Reviewed Prior HEP/Ed  Method of Education: []? Verbal  []? Demo  []? Written  Re:  Comprehension of Education:  []? Verbalizes understanding. []? Demonstrates understanding. []? Needs review. []? Demonstrates/verbalizes HEP/Ed previously given.       Treatment Plan:  Frequency/Duration:  3  Times a week, for 36  Visits.   C-9 auth'd 01/17/20 to 04/18/20  Therapeutic Exercise 59018, Manual Therapy 55254, Ultrasound [id number], Written Home Program and Hot Pack/Cold Pack 27055     Total Treatment Time:  50  Minutes     Time In/Time Out: 1300 - 1350       Electronically signed by PAYTON Acuña/L, CHT on 3/9/2020 at 12:59 PM

## 2020-03-11 ENCOUNTER — HOSPITAL ENCOUNTER (OUTPATIENT)
Dept: OCCUPATIONAL THERAPY | Facility: CLINIC | Age: 30
Setting detail: THERAPIES SERIES
Discharge: HOME | End: 2020-03-11
Payer: COMMERCIAL

## 2020-03-11 PROCEDURE — 97110 THERAPEUTIC EXERCISES: CPT

## 2020-03-11 NOTE — FLOWSHEET NOTE
(pounds): Will have a right  strength of 30 or more pounds - MET (46.3 pounds). 4. Increase function:UE Functional Index Score 46/80 or more to promote increased function abilities - MET (76/80). 5. Scars (pin sites) will be soft and pliable with minimal tethering - MET. 6. Decrease Edema: circumfirential measurements of bilateral ring fingers will have a variance of 0.2 cm or less - MET (0.2 variance). 7. Independent with HEP in 3 sessions - MET.     Long Term Goals: (  20  Treatments)  1. Decrease Pain:  Will have 0/10 pain with mild to moderately resistive activity. MET  2. Increase AROM/PROM (degrees): Will have full composite fist/full composite extension of all right fingers (WNL) - MET (full fist/extension)  3. Increase strength (pounds): New  goal (revised 02/19/20): Will have a right  strength of 60 or more pounds - MET (69.6 pounds). New  goal (revised 03/09/20): Will have a right  strength of 110 or more pounds. 4.   Increase function:  New function goal (revised 02/19/20): UE Functional Index Score 70/80 or more to promote increased function abilities - MET (76/80). New function goal (revised 03/09/20): UE Functional Index Score 80/80 topromote increased function abilities. 5.   Decrease Edema: circumfirential measurements of bilateral ring fingers will have a variance of 0.0 cm (edema resolved). Ongoing (Current variance of 0.2 cm)     Patient Goals: Regain full function - Improved, Unmet, full range of motion of my hand - MET. New goal (added 02/19/20): Be able to fire firearms for work. Ongoing    Pt. Education:  [] Yes  [x] No  [] Reviewed Prior HEP/Ed  Method of Education: [] Verbal  [] Demo  [] Written  Re:  Comprehension of Education:  [] Verbalizes understanding. [] Demonstrates understanding. [] Needs review. [] Demonstrates/verbalizes HEP/Ed previously given. Treatment Plan:  Frequency/Duration:  3  Times a week, for 36  Visits.   C-9 auth'd 01/17/20 to

## 2020-03-12 ENCOUNTER — HOSPITAL ENCOUNTER (OUTPATIENT)
Dept: OCCUPATIONAL THERAPY | Facility: CLINIC | Age: 30
Setting detail: THERAPIES SERIES
Discharge: HOME | End: 2020-03-12
Payer: COMMERCIAL

## 2020-03-12 PROCEDURE — 97110 THERAPEUTIC EXERCISES: CPT

## 2020-03-12 NOTE — FLOWSHEET NOTE
[] [place]       Occupational Therapy             1st floor       [address], [place]         Phone: [phone]       Fax: [phone] [x] [address] [facility]            [address] , [address] [place] [address]     Phone: [phone]     Fax: [phone]      Occupational Therapy Daily Treatment Note    Date:  3/12/2020  Patient Name:  Levar Miller    :  1990  MRN: [id number]  Insurance: [facility] - [employer]: [employer]     Medical Diagnosis: Closed displaced fracture of shaft of fourth metacarpal bone of right hand (spiral fx) S62.324A. Rehab Codes: Stiffness in hand M25.64, adherent scar L90.5, edema localized R60.0, pain in right hand M79.641,  pain in right finger(s) M79.644.      Onset Date: 19               Next Dr. Bandar Hallner: 20  1:15 PM   [place] office  Visit# / Total Visits:   C-9 [facility] for 36 visits 20 to 20.    Cancels/No Shows: 0/0      Subjective:    Pain:  [] Yes  [x] No Location: Between the Middle and ring finger MCP joints Pain Rating: (0-10 scale) 0/10  Pain altered Tx:  [] No  [x] Yes  Action: Ultrasound to intrinsic musculature adjacent to fracture site  Pt Comments: NA    Objective:  Modalities:  Modality Flow Sheet:  START STOP Tx Modality       Electrical Stim:      20 Ultrasound: 0.8 W/cm2 x 8 mins total  Duty factor: __100%  __50%  __33% __20%  Head size: 2 cm  MHz: __1mHz  __3mHz  Location: Dorsal MCP joints of middle and ring fingers       Hot Pack:       Cold Pack:      Exercises:  Exercise Reps/Time Weight/Level Comments   Edema massage     Discontinued      Scar massage     Discontinued      AROM- right hand - composite 20 reps   Discontinued   AROM - right hand - blocked     Discontinued   Resistive  - foam block 20 reps Green Increased reps      Towel crunches      Discontinued   Place and hold (fisting)     Discontinued   Marbles - ulnar release     Discontinued   Resistive Demonstrates/verbalizes HEP/Ed previously given. Treatment Plan:  Frequency/Duration:  3  Times a week, for 36  Visits. C-9 auth'd 01/17/20 to 04/18/20  Therapeutic Exercise 66780, Manual Therapy 73802, Ultrasound 61876, Written Home Program and Hot Pack/Cold Pack 05458       1300 - 1332  Total Treatment Time:  28   Min.       Electronically signed by:  PAYTON Saab/ZAID , DASHT

## 2020-03-16 ENCOUNTER — HOSPITAL ENCOUNTER (OUTPATIENT)
Dept: OCCUPATIONAL THERAPY | Facility: CLINIC | Age: 30
Setting detail: THERAPIES SERIES
Discharge: HOME | End: 2020-03-16
Payer: COMMERCIAL

## 2020-03-16 PROCEDURE — 97110 THERAPEUTIC EXERCISES: CPT

## 2020-03-16 NOTE — FLOWSHEET NOTE
[] [place]       Occupational Therapy             1st floor       [address], [place]         Phone: [phone]       Fax: [phone] [x] [address] at            [address] , [address] [place] [address]     Phone: [phone]     Fax: [phone]      Occupational Therapy Daily Treatment Note    Date:  3/16/2020  Patient Name:  Sammie Longoria    :  1990  MRN: [id number]  Insurance: [facility] - [employer]: [employer]     Medical Diagnosis: Closed displaced fracture of shaft of fourth metacarpal bone of right hand (spiral fx) S62.324A. Rehab Codes: Stiffness in hand M25.64, adherent scar L90.5, edema localized R60.0, pain in right hand M79.641,  pain in right finger(s) M79.644.      Onset Date: 19               Next Dr. Nazario Rowe: 20  1:15 PM   [place] [facility]  Visit# / Total Visits:   C-9 [facility] for 36 visits 20 to 20.    Cancels/No Shows: 0/0      Subjective:    Pain:  [] Yes  [x] No Location: Between the Middle and ring finger MCP joints Pain Rating: (0-10 scale) 0/10  Pain altered Tx:  [] No  [x] Yes  Action: Ultrasound to intrinsic musculature adjacent to fracture site  Pt Comments: NA    Objective:  Modalities:  Modality Flow Sheet:  START STOP Tx Modality       Electrical Stim:      20 Ultrasound: 0.8 W/cm2 x 8 mins total  Duty factor: __100%  __50%  __33% __20%  Head size: 2 cm  MHz: __1mHz  __3mHz  Location: Dorsal MCP joints of middle and ring fingers       Hot Pack:       Cold Pack:      Exercises:  Exercise Reps/Time Weight/Level Comments   Edema massage     Discontinued      Scar massage     Discontinued      AROM- right hand - composite 20 reps   Discontinued   AROM - right hand - blocked     Discontinued   Resistive  - foam block 20 reps Green Increased reps      Towel crunches      Discontinued   Place and hold (fisting)     Discontinued   Marbles - ulnar release     Discontinued   Resistive  with hand exercisor 30 reps 45 pounds Increased reps   Resistive /extension - with putty 10 reps each [place]house Completed   Resistive pinch with foam cubes and pinch pin - ring and little fingers 1 series 4 pounds  (green) Completed   Digi-flex (individual resistive finger flexion) 10 reps 7 pounds Completed   Resistive finger extension with rubber band ball 10 reps Number 64 rubber bands Completed   Wrist progressive resistive exercise (flexion/extension) 10 reps 10 pounds Added          Comments/Assessment:   Pt is s/p spiral fracture of the shaft of the right dominant fourth metacarpal.   Edema: Zero to slight on the ulnar aspect of the hand as evidenced by increased skin wrinkling, increased metacarpal phalangeal (MCP) joint definition. Pt to continue occupational therapy for an additional 2-4 weeks. Pt returned to work on light duty on 03/02/20, sees physicain today, and again 04/08/20. Treatment emphasis  on strengthening in order to return to full duty. Plan for return to work full duty following physician appointment 04/08/20. AROM: Pt demo's ability to make a full fist and full finger extension. Functional exercises completed decreased due to pain with exercise, pain returned to 0/10 at end of exercise. Ultrasound re-discontinued, 0/10 pain on arrival..    Pain 0/10 pre- and post treatment. Specific Instructions for Next Treatment:      Treatment Charges: Mins Units Time In/Out   []?  Modalities:       [x]?   Ther Exercise 37 2 7738 - 1338   []?  Manual Therapy      []?  Ther Activities         []?           []?           []?           Total Treatment time 37 min             Assessment: [x] Progressing toward goals. [] No change. [] Other:    Short Term Goals: (  10  Treatments)  1. Decrease Pain:  Will have 0/10 pain with non-resistive to mildly resistive activity - MET  2. Increase AROM/PROM (degrees): Will have full composite PROM flexion/extension of all fingers/all joints - MET.

## 2020-03-18 ENCOUNTER — HOSPITAL ENCOUNTER (OUTPATIENT)
Dept: OCCUPATIONAL THERAPY | Facility: CLINIC | Age: 30
Setting detail: THERAPIES SERIES
Discharge: HOME | End: 2020-03-18
Payer: COMMERCIAL

## 2020-03-18 NOTE — FLOWSHEET NOTE
[x] [facility]) [facility] &  Therapy  [address]    [phone]  F: [phone]   [] [address] [facility]  [facility]   [address]  P: [phone]  F: [phone]  [] [address]  [address] [facility] [address]  P: [phone]  F: [phone]  [] [address]  [facility]   [address]   [place]: [phone]  F: [phone]   [] [place] [address] [facility] [address]  [place]: [phone]   F: [phone]     Physical Therapy Cancel/No Show note    Date: 3/18/2020  Patient: Didier Collier  : 1990  MRN: [id number]    Cancels/No Shows to date:     For today's appointment patient:    [x]  Cancelled    [] Rescheduled appointment    [] No-show     Reason given by patient:    []  Patient ill    []  Conflicting appointment    [] No transportation      [] Conflict with work    [] No reason given    [] Weather related    [] Other:      Comments:  Clinic called and informed patient that provider is ill and unable to make 3/19/20 appointment. Patient expressed understanding and thanked for the information.     Visit cx not to count against patient      [] Next appointment was confirmed    Electronically signed by: Javi Mendoza

## 2020-03-19 ENCOUNTER — HOSPITAL ENCOUNTER (OUTPATIENT)
Dept: OCCUPATIONAL THERAPY | Facility: CLINIC | Age: 30
Setting detail: THERAPIES SERIES
Discharge: HOME | End: 2020-03-19
Payer: COMMERCIAL

## 2020-03-23 ENCOUNTER — HOSPITAL ENCOUNTER (OUTPATIENT)
Dept: OCCUPATIONAL THERAPY | Facility: CLINIC | Age: 30
Setting detail: THERAPIES SERIES
Discharge: HOME | End: 2020-03-23
Payer: COMMERCIAL

## 2020-03-25 ENCOUNTER — APPOINTMENT (OUTPATIENT)
Dept: OCCUPATIONAL THERAPY | Facility: CLINIC | Age: 30
End: 2020-03-25
Payer: COMMERCIAL

## 2020-03-26 ENCOUNTER — APPOINTMENT (OUTPATIENT)
Dept: OCCUPATIONAL THERAPY | Facility: CLINIC | Age: 30
End: 2020-03-26
Payer: COMMERCIAL

## 2020-03-30 ENCOUNTER — APPOINTMENT (OUTPATIENT)
Dept: OCCUPATIONAL THERAPY | Facility: CLINIC | Age: 30
End: 2020-03-30
Payer: COMMERCIAL

## 2020-09-09 NOTE — DISCHARGE SUMMARY
increased function abilities - MET (76/80). 5. Scars (pin sites) will be soft and pliable with minimal tethering - MET. 6. Decrease Edema: circumfirential measurements of bilateral ring fingers will have a variance of 0.2 cm or less - MET (0.2 variance). 7. Independent with HEP in 3 sessions - MET.     Long Term Goals: (  20  Treatments)  1.   Decrease Pain:  Will have 0/10 pain with mild to moderately resistive activity. MET  2.   Increase AROM/PROM (degrees): Will have full composite fist/full composite extension of all right fingers (WNL) - MET (full fist/extension)  3.   Increase strength (pounds): New  goal (revised 02/19/20): Will have a right  strength of 60 or more pounds - MET (69.6 pounds). New  goal (revised 03/09/20): Will have a right  strength of 110 or more pounds. 4.   Increase function:  New function goal (revised 02/19/20): UE Functional Index Score 70/80 or more to promote increased function abilities - MET (76/80).   New function goal (revised 03/09/20): UE Functional Index Score 80/80 topromote increased function abilities. 5.   Decrease Edema: circumfirential measurements of bilateral ring fingers will have a variance of 0.0 cm (edema resolved). Ongoing (Current variance of 0.2 cm)     Patient Goals: Regain full function - Improved, Unmet, full range of motion of my hand - MET. New goal (added 02/19/20): Be able to fire firearms for work. Ongoing   End of Last Available Daily Progress Note 03/16/20.     Treatment to Date:  [x] Therapeutic Exercise    [] Modalities:  [] Therapeutic Activity     [x] Ultrasound  [] Electrical Stimulation  [] Ortho refit/check             [] Massage   [] Fluidotherapy  [] Neuromuscular Re-education [] Cold/hotpack [] Iontophoresis: 4 mg/mL  [x] Instruction in Home Exercise Program                     Dexamethasone Sodium  [x] Manual Therapy             Phosphate 40-80 mAmin          [] Paraffin        [] Other:    Discharge Status:     [] Pt recovered from conditions. Treatment goals were met. [x] Pt received maximum benefit. No further therapy indicated at this time. [] Pt to continue exercise/home instructions independently. [x] Therapy interrupted due to: COVID, closing of clinic. [] Pt has 2 or more no shows/cancels, is discontinued per our policy. [] Pt has completed prescribed number of treatment sessions. [x] Other: Pt released to return to work full duty by physician 04/08/20. Electronically signed by: PAYTON Washburn/L, CHT    If you have any questions or concerns, please don't hesitate to call.   Thank you for your referral.

## 2021-01-03 ENCOUNTER — HOSPITAL ENCOUNTER (EMERGENCY)
Age: 31
Discharge: HOME | End: 2021-01-03
Attending: EMERGENCY MEDICINE
Payer: COMMERCIAL

## 2021-01-03 VITALS
DIASTOLIC BLOOD PRESSURE: 82 MMHG | SYSTOLIC BLOOD PRESSURE: 129 MMHG | OXYGEN SATURATION: 98 % | HEART RATE: 72 BPM | RESPIRATION RATE: 15 BRPM | TEMPERATURE: 98.6 F

## 2021-01-03 DIAGNOSIS — Z20.822 CONTACT WITH AND (SUSPECTED) EXPOSURE TO COVID-19: ICD-10-CM

## 2021-01-03 DIAGNOSIS — B34.9 VIRAL ILLNESS: Primary | ICD-10-CM

## 2021-01-03 PROCEDURE — 99284 EMERGENCY DEPT VISIT MOD MDM: CPT

## 2021-01-03 ASSESSMENT — PAIN DESCRIPTION - DESCRIPTORS: DESCRIPTORS: PRESSURE

## 2021-01-03 ASSESSMENT — PAIN SCALES - GENERAL: PAINLEVEL_OUTOF10: 3

## 2021-01-03 ASSESSMENT — PAIN DESCRIPTION - LOCATION: LOCATION: HEAD

## 2021-01-03 NOTE — ED PROVIDER NOTES
[address] Count includes the [facility] ED  [address] [facility] [address]  Phone: [phone]  Fax: [phone]      Attending Physician Attestation    I performed a history and physical examination of the patient and discussed management with the mid level provider. I reviewed the mid level provider's note and agree with the documented findings and plan of care. Any areas of disagreement are noted on the chart. I was personally present for the key portions of any procedures. I have documented in the chart those procedures where I was not present during the key portions. I have reviewed the emergency nurses triage note. I agree with the chief complaint, past medical history, past surgical history, allergies, medications, social and family history as documented unless otherwise noted below. Documentation of the HPI, Physical Exam and Medical Decision Making performed by mid level providers is based on my personal performance of the HPI, PE and MDM. For Physician Assistant/ Nurse Practitioner cases/documentation I have personally evaluated this patient and have completed at least one if not all key elements of the E/M (history, physical exam, and MDM). Additional findings are as noted. CHIEF COMPLAINT       Chief Complaint   Patient presents with    Otalgia     right    Facial Pain         HISTORY OF PRESENT ILLNESS    Ana Maria Zuñiga is a 27 y.o. male who presents upper respiratory type symptoms and has had exposure from his wife for Covid. PAST MEDICAL HISTORY    has no past medical history on file. SURGICAL HISTORY      has a past surgical history that includes Refractive surgery and Finger fracture surgery (Right, [id number]). CURRENT MEDICATIONS       Previous Medications    No medications on file       ALLERGIES     has No Known Allergies. FAMILY HISTORY     has no family status information on file. family history is not on file.     SOCIAL HISTORY      reports that he has never smoked. He has never used smokeless tobacco. He reports that he does not drink alcohol or use drugs. PHYSICAL EXAM     INITIAL VITALS:  temperature is 98.6 °F (37 °C). His blood pressure is 129/82 and his pulse is 72. His respiration is 15 and oxygen saturation is 98%. Constitutional: Alert, oriented x3, nontoxic, answering questions appropriately, acting properly for age, in no acute distress   HEENT: Extraocular muscles intact, mucous membranes moist. Pupils equal, round, reactive to light, TMs clear bilaterally, no posterior pharyngeal erythema or exudates. Neck: Trachea midline, supple without lymphadenopathy, no posterior midline neck tenderness to palpation   Musculoskeletal: No extremity pain or swelling   Neurologic: Moving all 4 extremities without difficulty   Skin: Warm and dry       DIAGNOSTIC RESULTS     EKG: All EKG's are interpreted by the Emergency Department Physician who either signs or Co-signs this chart in the absence of a cardiologist.        RADIOLOGY:   Non-plain film images such as CT, Ultrasound and MRI are read by the radiologist. Plain radiographic images are visualized and the radiologist interpretations are reviewed as follows:       LABS:  No results found for this visit on 01/03/21. EMERGENCY DEPARTMENT COURSE:   Vitals:    Vitals:    01/03/21 1242   BP: 129/82   Pulse: 72   Resp: 15   Temp: 98.6 °F (37 °C)   SpO2: 98%     -------------------------  BP: 129/82, Temp: 98.6 °F (37 °C), Pulse: 72, Resp: 15      PERTINENT ATTENDING PHYSICIAN COMMENTS:          (Please note that portions of this note were completed with a voice recognition program.  Efforts were made to edit the dictations but occasionally words are mis-transcribed.)    Ornelas MD, F.A.C.E.P.   Attending Emergency Medicine Physician      Ludwig Sorensen MD  01/03/21 9070

## 2021-01-03 NOTE — ED NOTES
Pt arrives to ED with c/o bilateral ear pain and facial pressure. He states that he gets a sinus infection every year. Pt resting in bed, comfort offered, no concerns no s/s of distress.       Gustine Corner, RN  01/03/21 4048

## 2021-01-03 NOTE — ED PROVIDER NOTES
[address] [facility]  [address] [facility] [address]  Phone: [phone]  Fax: [phone]        Pt Name: Linda Velázquez  MRN: [id number]  [place] 1990  Date of evaluation: 1/3/21    [address] [place] [address]       Chief Complaint   Patient presents with    Otalgia     right    Facial Pain       HISTORY OF PRESENT ILLNESS (Location/Symptom, Timing/Onset, Context/Setting, Quality, Duration, Modifying Factors, Severity)      Linda Velázquez is a 27 y.o. male with no pertinent PMH who presents to the ED via private auto with URI symptoms and known COVID-19 exposure. Patient states that his girlfriend who he lives with was positive for Covid and she has been experiencing symptoms for 10 days. Patient states that for the past 7 days he has been experiencing bilateral ear pain/pressure, right greater than left, as well as significant nasal congestion, mild cough, and a gradual onset sinus headache. Patient states that he feels like he gets this every year so he did not think this was Covid. He did have a temperature of 98.2 that improved with ibuprofen but no other elevated temperatures. Denies any chest pain or shortness of breath. He has not taken any other medications for his discomfort. Denies any exacerbating or alleviating factors. Denies any nausea, vomiting, diarrhea, abdominal pain, difficulty breathing, difficulty swallowing, sore throat, or any other concerns at this time. PAST MEDICAL / SURGICAL / SOCIAL / FAMILY HISTORY     PMH:  has no past medical history on file. Surgical History:  has a past surgical history that includes Refractive surgery and Finger fracture surgery (Right, [id number]). Social History:  reports that he has never smoked. He has never used smokeless tobacco. He reports that he does not drink alcohol or use drugs. Family History: has no family status information on file. family history is not on file.   Psychiatric History: None    Allergies: Patient has no known allergies. Home Medications:   Prior to Admission medications    Not on File       REVIEW OF SYSTEMS  (2-9 systems for level 4, 10 ormore for level 5)      Review of Systems    Constitutional: See HPI. Eyes: Denies vision changes. HENT: See HPI. Respiratory:  See HPI. Cardiovascular:  See HPI. GI:  See HPI. Musculoskeletal: Denies recent trauma. Skin: Denies new rashes or wounds. Neurologic:  Denies new numbness or weakness. Psychiatric: Denies sleep disturbances. Endocrine:  Denies unexpected weight loss  Heme: Denies bleeding disorders. All other systems negative except as marked. PHYSICAL EXAM  (up to 7 for level 4, 8 or more for level 5)      INITIAL VITALS:  temperature is 98.6 °F (37 °C). His blood pressure is 129/82 and his pulse is 72. His respiration is 15 and oxygen saturation is 98%. Vital signs reviewed. Physical Exam    General:  Alert, cooperative, well-groomed, well-nourished, appears stated age, and is in no acute distress. Head:  Normocephalic, atraumatic, and without obvious abnormality. Eyes:  Sclerae/conjunctivae clear without injection, pallor, or icterus. Corneas clear without opacities. EOM's intact. ENT: Eliceo Anais are in proper alignment without lesions, deformities, masses, erythema, or tenderness. No tragal tenderness. Canals appear slightly erythematous surrounding the TMs are otherwise clear without swelling or discharge. The bilateral TM's are intact, clear, and translucent without scarring or injection. The light reflex and bony landmarks are present without erythema, bulging or retraction. Hearing grossly intact. Nares patent bilaterally without flaring, septum midline without perforation, turbinates intact, erythematous, and moist. No polyps, discharge, or epistaxis. No frontal or maxillary sinus tenderness with palpation. Lips and buccal mucosa are pink and moist without lesions. Good dentition. Gingivae is pink and without lesions. Tongue and uvula midline. Symmetric elevation of soft palate upon phonation. No hoarseness or muffled voice. Oropharynx is clear, without erythema. Tonsils are symmetrical, without enlargement or erythema, bilaterally. No exudates or drainage. Neck: Supple and symmetrical. Trachea midline. No adenopathy. No jugular venous distention. Lungs:   No respiratory distress. Clear to auscultation bilaterally. No wheezes, rhonchi, or rales. Heart:  Regular rate. Regular rhythm. No murmurs, rubs, or gallops. Skin: Warm and dry. Soft, good turgor, and well-hydrated. No obvious rashes or lesions. Neurologic: GCS is 15 and no focal deficits are appreciated. Normal gait. Grossly normal motor and sensation. Speech clear. Psychiatric: Normal mood and affect. Normal behavior. Coherent thought process. 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 At this time there is significant evidence of Covid-19 community spread due to this pandemic, and I feel the patient's symptoms are consistent with a mild Covid-19 infection. The patient is nontoxic and well appearing, no evidence of hypoxia or impending respiratory failure. The patient is tolerating PO. I do not feel the patient has evidence of significant dehydration or end organ failure at this time. The patient does not have risk factors for severe disease such as cardiovascular disease, hypertension, uncontrolled diabetes, chronic respiratory disease, underlying malignancy, immunocompromised, Age > 60 years. I do not feel the patient has an emergent medical condition at this time. The patient is able to obtain a rapid Covid swab through his work as he is a  and he will do this.       The patient is referred to appropriate outpatient follow up through their PCP or [address] [facility]. I discussed with the patient home isolation measures, anticipatory guidance, discharge instructions, and to return immediately for worsening of symptoms. The patient is agreeable with this plan. This patient was seen by the attending physician and they agreed with the assessment and plan. PLAN (LABS / IMAGING / EKG):  No orders of the defined types were placed in this encounter. MEDICATIONS ORDERED:  No orders of the defined types were placed in this encounter. Controlled Substances Monitoring:     DIAGNOSTIC RESULTS     LABS:  No results found for this visit on 01/03/21. EMERGENCY DEPARTMENT COURSE           Vitals:    Vitals:    01/03/21 1242   BP: 129/82   Pulse: 72   Resp: 15   Temp: 98.6 °F (37 °C)   SpO2: 98%     -------------------------  BP: 129/82, Temp: 98.6 °F (37 °C), Pulse: 72, Resp: 15      RE-EVALUATION:  No re-evaluation was necessary as patient was discharged home after first impression from myself and the attending. CONSULTS:  None    PROCEDURES:  None    FINAL IMPRESSION      1. Viral illness    2. Contact with and (suspected) exposure to covid-19          DISPOSITION / PLAN     CONDITION ON DISPOSITION:   Good / Stable for discharge. PATIENT REFERRED TO:  [address] [place] [address], [address]  [facility] [id number]    Call in 2 days  To schedule a follow-up appointment after quarantine if your symptoms persist      DISCHARGE MEDICATIONS:  There are no discharge medications for this patient.       Martha Wagner PA-C   Emergency Medicine Physician Assistant    (Please note that portions of this note were completed with a voice recognition program.  Efforts were made to edit the dictations but occasionally words aremis-transcribed.)        Williams Johns PA-C  01/03/21 1359

## 2021-01-04 ENCOUNTER — HOSPITAL ENCOUNTER (OUTPATIENT)
Age: 31
Discharge: HOME | End: 2021-01-04
Payer: COMMERCIAL

## 2021-01-04 LAB
SARS-COV-2, RAPID: ABNORMAL
SARS-COV-2: ABNORMAL
SARS-COV-2: DETECTED
SOURCE: ABNORMAL

## 2021-01-04 PROCEDURE — U0003 INFECTIOUS AGENT DETECTION BY NUCLEIC ACID (DNA OR RNA); SEVERE ACUTE RESPIRATORY SYNDROME CORONAVIRUS 2 (SARS-COV-2) (CORONAVIRUS DISEASE [COVID-19]), AMPLIFIED PROBE TECHNIQUE, MAKING USE OF HIGH THROUGHPUT TECHNOLOGIES AS DESCRIBED BY CMS-2020-01-R: HCPCS

## 2021-01-05 ENCOUNTER — TELEPHONE (OUTPATIENT)
Dept: FAMILY MEDICINE CLINIC | Age: 31
End: 2021-01-05

## 2024-08-15 ENCOUNTER — HOSPITAL ENCOUNTER (EMERGENCY)
Age: 34
Discharge: HOME | End: 2024-08-15
Attending: EMERGENCY MEDICINE
Payer: COMMERCIAL

## 2024-08-15 ENCOUNTER — APPOINTMENT (OUTPATIENT)
Dept: GENERAL RADIOLOGY | Age: 34
End: 2024-08-15
Payer: COMMERCIAL

## 2024-08-15 VITALS
TEMPERATURE: 97.5 F | SYSTOLIC BLOOD PRESSURE: 139 MMHG | RESPIRATION RATE: 17 BRPM | HEART RATE: 69 BPM | DIASTOLIC BLOOD PRESSURE: 88 MMHG | OXYGEN SATURATION: 97 %

## 2024-08-15 DIAGNOSIS — S62.304A CLOSED DISPLACED FRACTURE OF FOURTH METACARPAL BONE OF RIGHT HAND, UNSPECIFIED PORTION OF METACARPAL, INITIAL ENCOUNTER: Primary | ICD-10-CM

## 2024-08-15 PROCEDURE — 2500000003 HC RX 250 WO HCPCS

## 2024-08-15 PROCEDURE — 99283 EMERGENCY DEPT VISIT LOW MDM: CPT

## 2024-08-15 PROCEDURE — 26700 TREAT KNUCKLE DISLOCATION: CPT

## 2024-08-15 PROCEDURE — 73130 X-RAY EXAM OF HAND: CPT

## 2024-08-15 RX ORDER — IBUPROFEN 800 MG/1
800 TABLET ORAL EVERY 6 HOURS PRN
Qty: 10 TABLET | Refills: 0 | Status: SHIPPED | OUTPATIENT
Start: 2024-08-15

## 2024-08-15 RX ORDER — IBUPROFEN 600 MG/1
600 TABLET ORAL ONCE
Status: DISCONTINUED | OUTPATIENT
Start: 2024-08-15 | End: 2024-08-15 | Stop reason: HOSPADM

## 2024-08-15 RX ORDER — LIDOCAINE HYDROCHLORIDE 10 MG/ML
20 INJECTION, SOLUTION INFILTRATION; PERINEURAL ONCE
Status: COMPLETED | OUTPATIENT
Start: 2024-08-15 | End: 2024-08-15

## 2024-08-15 RX ORDER — ACETAMINOPHEN 500 MG
1000 TABLET ORAL 3 TIMES DAILY
Qty: 20 TABLET | Refills: 0 | Status: SHIPPED | OUTPATIENT
Start: 2024-08-15 | End: 2024-08-19

## 2024-08-15 RX ADMIN — LIDOCAINE HYDROCHLORIDE 20 ML: 10 INJECTION, SOLUTION INFILTRATION; PERINEURAL at 18:15

## 2024-08-15 ASSESSMENT — LIFESTYLE VARIABLES
HOW OFTEN DO YOU HAVE A DRINK CONTAINING ALCOHOL: PATIENT DECLINED
HOW MANY STANDARD DRINKS CONTAINING ALCOHOL DO YOU HAVE ON A TYPICAL DAY: PATIENT DECLINED

## 2024-08-15 ASSESSMENT — PAIN SCALES - GENERAL: PAINLEVEL_OUTOF10: 7

## 2024-08-15 NOTE — ED PROVIDER NOTES
I performed a history and physical examination of the patient and discussed management with the resident. I reviewed the resident’s note and agree with the documented findings and plan of care. Any areas of disagreement are noted on the chart. I was personally present for the key portions of any procedures. I have documented in the chart those procedures where I was not present during the key portions. Unless noted in my documentation, I agree with the chief complaint, past medical history, past surgical history, allergies, medications, social and family history as documented. Documentation of the HPI, Physical Exam and Medical Decision Making performed by medical students or scribes is based on my personal performance of the HPI, PE and MDM.   For Phys Assistant/ Nurse Practitioner cases/documentation I have personally evaluated this patient and have completed at least one if not all key elements of the E/M (history, physical exam, and MDM). I find the patient's history and physical exam are consistent with the NP/PA documentation. I agree with the care provided, treatment rendered, disposition and followup plan.   Additional findings are as noted.    Sandoval Valverde MD  Attending Emergency  Physician        This patient presented to the emergency department with a history of having injured his right hand while he was at work.  Patient is a [employer] .  He is right-hand dominant.  Denies any other injury or complaint.  On examination he is awake and alert.  He is cooperative and responsive.  GCS is 15.  Examination of the hand reveals swelling and tenderness over the head of the ring finger metacarpal.  Distal capillary refill and sensation are intact.  Motor function is unimpaired.  I reviewed the radiographs and there is a fracture of the neck of the ring finger metacarpal with approximately 45 degrees of angulation apex dorsal.  Impression: Fracture of the neck of the ring finger metacarpal of the

## 2024-08-15 NOTE — ED NOTES
Pt arrives alert and oriented x4 and ambulatory from triage    Pt complains of R hand pain after an incident at work   Pt ring finger is sligtly below other fingers and patient states he's concerned bc he has a hx of breaking this hand so he has pins placed   RR even and unlabored.   NAD noted.   Whiteboard updated.  Will continue with plan of care.

## 2024-08-15 NOTE — DISCHARGE INSTRUCTIONS
You have been evaluated in the Emergency Department at [facility] 8/15/2024     Your recommendations and if necessary prescriptions from today's visit:   -Take medication as prescribed  -Follow-up with occupational health    You need to call Bharathi Samaniego MD  to make an appointment as directed for follow up.    Should you have any questions regarding your care or further treatment, please call [facility] Emergency Department at [phone].    PLEASE RETURN TO THE EMERGENCY DEPARTMENT IMMEDIATELY for   -Numbness, tingling, weakness  -Changing symptoms  -Worsening symptoms  -Unable to follow up with your primary care provider  -Any other care or concern     Orthopedic Instructions:    Your Injury: Closed dislocation of metacarpal joint of finger of right hand, initial encounter      - Weight bearing status: - Non-Weight Bearing (NWB): No weight or force may be applied through the limb.  - Keep dressing/splint clean and dry.  - Do not remove dressings or splint  - If splint were to fall off or become saturated, do not attempt to put back on or dry out. Return to ED immediately for reapplication.  - Always look for signs of compartment syndrome: pain out of proportion to the injury, pain not controlled with pain medication, numbness in digits, changing of color of digits (paleness). If these signs occur return to ED immediately for reassessment.   - Always work on motion (to non-injured FINGERS) while in splint to decrease swelling.  - Dress with plastic bag and rubber band to seal and repeat with second bag and rubber band when showering.  \"Press & Seal\" wrap also works for sealing the rubber bag when showering.  - Starting 3 days after surgery, Ok to shower but no soaks in a bath, hot tub, pool, etc  - Ice (20 minutes on and off 1 hour) and elevate above the level of the heart to reduce swelling and throbbing pain.  - Drink plenty of fluids.  - Call the office or come to Emergency
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
## 2024-08-16 ASSESSMENT — ENCOUNTER SYMPTOMS
NAUSEA: 0
VOMITING: 0
SHORTNESS OF BREATH: 0